# Patient Record
Sex: FEMALE | Race: WHITE | Employment: FULL TIME | URBAN - METROPOLITAN AREA
[De-identification: names, ages, dates, MRNs, and addresses within clinical notes are randomized per-mention and may not be internally consistent; named-entity substitution may affect disease eponyms.]

---

## 2018-01-10 ENCOUNTER — HOSPITAL ENCOUNTER (OUTPATIENT)
Facility: MEDICAL CENTER | Age: 63
End: 2018-01-10
Attending: DERMATOLOGY
Payer: COMMERCIAL

## 2018-01-10 ENCOUNTER — OFFICE VISIT (OUTPATIENT)
Dept: DERMATOLOGY | Facility: IMAGING CENTER | Age: 63
End: 2018-01-10
Payer: COMMERCIAL

## 2018-01-10 VITALS — TEMPERATURE: 96.8 F | WEIGHT: 208 LBS | BODY MASS INDEX: 38.28 KG/M2 | HEIGHT: 62 IN

## 2018-01-10 DIAGNOSIS — L57.0 ACTINIC KERATOSES: ICD-10-CM

## 2018-01-10 DIAGNOSIS — Z85.828 HISTORY OF BASAL CELL CARCINOMA: ICD-10-CM

## 2018-01-10 DIAGNOSIS — D48.5 NEOPLASM OF UNCERTAIN BEHAVIOR OF SKIN: ICD-10-CM

## 2018-01-10 PROCEDURE — 88305 TISSUE EXAM BY PATHOLOGIST: CPT

## 2018-01-10 PROCEDURE — 17003 DESTRUCT PREMALG LES 2-14: CPT | Performed by: DERMATOLOGY

## 2018-01-10 PROCEDURE — 17000 DESTRUCT PREMALG LESION: CPT | Performed by: DERMATOLOGY

## 2018-01-10 PROCEDURE — 11100 PR BIOPSY OF SKIN LESION: CPT | Mod: 59 | Performed by: DERMATOLOGY

## 2018-01-10 ASSESSMENT — ENCOUNTER SYMPTOMS
FEVER: 0
CHILLS: 0
WEIGHT LOSS: 0

## 2018-01-10 NOTE — PROGRESS NOTES
Dermatology New Patient Visit    Chief Complaint   Patient presents with   • Establish Care       Subjective:     HPI:   Page Zayas is a 62 y.o. female presenting for    Full skin exam today, h/o BCC  Area of concern skin - lesion right leg   Has been present for several months  Notes redness, mild itching  Does not believe it has grown in size  No treatments    Wears Suncreen regularly SPF 30   History of skin cancer: Yes, Details:  Basal on nose 7 years ago , last time she had a JOO  History of blistering/severe sunburns:Yes, Details:  Teenager   Family history of skin cancer:Yes, Details:  Sister , several basal cell carcinomas        Past Medical History:   Diagnosis Date   • Allergic rhinitis 6/6/2009   • Asthma, mild intermittent 6/6/2009   • Diabetes    • Diabetes mellitus, type 2 6/6/2009   • Dyslipidemia 6/6/2009   • High cholesterol    • Hypertension    • Hypovitaminosis D 6/6/2009   • IBS (irritable bowel syndrome) 6/6/2009   • Preventative health care 6/6/2009   • Restless leg syndrome    • S/P laparoscopic cholecystectomy 6/6/2009   • Sleep apnea 6/6/2009       Current Outpatient Prescriptions on File Prior to Visit   Medication Sig Dispense Refill   • hydrocod polst-chlorphen polst (TUSSIONEX) 10-8 MG/5ML LQCR Take 5 mL by mouth at bedtime as needed for Cough or Rhinitis. 120 mL 0   • neomycin-polymyxin-HC (CORTISPORIN) 3.5-82346-4 SUSP Place 5 Drops in ear 3 times a day. 1 Bottle 0   • hydrocodone-acetaminophen 2.5-167 mg/5 mL solution (LORTAB) 7.5-500 MG/15ML SOLN Take 10 mL by mouth 4 times a day as needed for Cough. 120 mL 0   • cefdinir (OMNICEF) 300 MG CAPS Take 1 Cap by mouth 2 times a day. 20 Each 0   • ondansetron (ZOFRAN) 4 MG TABS Take 1 Tab by mouth every 8 hours as needed for Nausea/Vomiting. 21 Each 1   • oxycodone-acetaminophen (PERCOCET) 5-325 MG TABS Take 1-2 Tabs by mouth every four hours as needed for Mild Pain (pain). 20 Each 0   • valsartan (DIOVAN) 320 MG tablet Take  1 Tab by mouth every day. 30 Each 11   • simvastatin (ZOCOR) 40 MG TABS Take 1 Tab by mouth every evening. 30 Each 11   • ropinirole (REQUIP) 2 MG tablet Take 1 Tab by mouth every day. 30 Each 6   • insulin glargine (LANTUS) 100 UNIT/ML SOLN Inject 0.4 mL as instructed every day. 2 Vial 11   • insulin lispro (HUMALOG) 100 UNIT/ML SOLN Inject 0.15 mL as instructed 3 times a day before meals. 2 Vial 11   • GUAIFENESIN-CODEINE 100-10 MG/5ML PO SYRP Take 5 mL by mouth 3 times a day as needed. 50 mL 0   • PROVENTIL  (90 BASE) MCG/ACT INH AERS Inhale 2 Puffs by mouth every 6 hours as needed for Shortness of Breath.     • ZYRTEC 10 MG PO TABS Take 10 mg by mouth every day.     • PROZAC 40 MG PO CAPS Take 40 mg by mouth every day.     • SINGULAIR 10 MG PO TABS Take 10 mg by mouth every day.     • VITAMIN D 2000 UNIT PO TABS Take  by mouth.       No current facility-administered medications on file prior to visit.        Allergies   Allergen Reactions   • Lotensin [Lotensin]      cough   • Sulfa Drugs        No family history on file.    Social History     Social History   • Marital status: Single     Spouse name: N/A   • Number of children: N/A   • Years of education: N/A     Occupational History   • Not on file.     Social History Main Topics   • Smoking status: Never Smoker   • Smokeless tobacco: Not on file   • Alcohol use No   • Drug use: No   • Sexual activity: Not on file     Other Topics Concern   • Not on file     Social History Narrative   • No narrative on file       Review of Systems   Constitutional: Negative for chills, fever and weight loss.   Skin: Negative for itching and rash.   All other systems reviewed and are negative.       Objective:     A full mucocutaneous exam was completed including: scalp, hair, ears, face, eyelids, conjunctiva, lips, gums/tongue/oropharynx, neck, chest breasts, abdomen, back, left and right upper extremities (including hands/digits and fingernails), left and right lower  "extremities (including feet/toes, toenails), buttocks, including external genitalia with the following pertinent findings listed below. Remaining above-listed examined areas within normal limits / negative for rashes or lesions.    Temperature 36 °C (96.8 °F), height 1.575 m (5' 2\"), weight 94.3 kg (208 lb).    Physical Exam   Constitutional: She is oriented to person, place, and time and well-developed, well-nourished, and in no distress.   HENT:   Head: Normocephalic and atraumatic.       Right Ear: External ear normal.   Left Ear: External ear normal.   Nose: Nose normal.   Mouth/Throat: Oropharynx is clear and moist.   Eyes: Conjunctivae and lids are normal.   Neck: Normal range of motion. Neck supple.   Cardiovascular: Intact distal pulses.    Pulmonary/Chest: Effort normal.   Neurological: She is alert and oriented to person, place, and time.   Skin: Skin is warm and dry.        Psychiatric: Mood and affect normal.   Vitals reviewed.      DATA: none applicable to review    Assessment and Plan:     1. Neoplasm of uncertain behavior of skin  Procedure Note   Procedure: Biopsy by shave technique  Location: as noted above  Size: as noted in exam  Preoperative diagnosis: SCIS vs BLK/inflamed SK vs eczema vs other  Risks, benefits and alternatives of procedure discussed and written informed consent obtained. Time out completed. Area of biopsy prepped with alcohol. Anesthesia with 1% lidocaine with epinephrine administered with 30 gauge needle. Shave biopsy of the site performed. Hemostasis achieved with pressure and aluminum chloride. Vaseline applied to wound with bandage. Patient tolerated procedure well and there were no complications. The specimen was sent to the pathology lab by the staff. Wound care was discussed.  - PATHOLOGY SPECIMEN; Future    2. Actinic keratoses - nose, left cheek  CRYOTHERAPY:  Discussed risks and benefits of cryotherapy. Patient verbally agreed. 2 applications of cryotherapy were " applied to 3 lesions on the face. Patient tolerated procedure well. Aftercare instructions given.    3. History of basal cell carcinoma  - Skin cancer education  - discussed importance of sun protective clothing, eyewear  - discussed importance of daily use of broad spectrum sunscreen with SPF 30 or greater, as well as need for reapplication ~every 2 hours when exposed to UVR  - discussed importance of regular self-exams, ideally once per month, annual exams in clinic  - ABCDE's of melanoma discussed  - patient to bring any new or concerning lesions to my attention      Followup: Return in about 1 year (around 1/10/2019) for JOO, additional f/u pending biopsy results.    Joan Calhoun M.D.

## 2018-01-15 ENCOUNTER — TELEPHONE (OUTPATIENT)
Dept: DERMATOLOGY | Facility: IMAGING CENTER | Age: 63
End: 2018-01-15

## 2018-04-25 ENCOUNTER — OFFICE VISIT (OUTPATIENT)
Dept: DERMATOLOGY | Facility: IMAGING CENTER | Age: 63
End: 2018-04-25
Payer: COMMERCIAL

## 2018-04-25 DIAGNOSIS — L57.0 ACTINIC KERATOSIS: ICD-10-CM

## 2018-04-25 DIAGNOSIS — Z85.828 HISTORY OF BASAL CELL CARCINOMA: ICD-10-CM

## 2018-04-25 DIAGNOSIS — D09.9 SQUAMOUS CELL CARCINOMA IN SITU: ICD-10-CM

## 2018-04-25 PROCEDURE — 17000 DESTRUCT PREMALG LESION: CPT | Performed by: DERMATOLOGY

## 2018-04-25 ASSESSMENT — ENCOUNTER SYMPTOMS
CHILLS: 0
FEVER: 0
WEIGHT LOSS: 0

## 2018-04-25 NOTE — PROGRESS NOTES
Dermatology New Patient Visit    No chief complaint on file.      Subjective:     HPI:   Page Zayas is a 62 y.o. female presenting for    Follow-up, spot check  S/p bx lesion on the right lower leg, results SCCIS, margins clear  States area well healed    Has red spot on the left anterior leg  Present for ~1 year  No change in size  No itching/bleeding/pain  No symptoms    Wears Suncreen regularly SPF 30   History of skin cancer: Yes, Details:  Basal on nose 7 years ago  History of blistering/severe sunburns:Yes, Details:  Teenager   Family history of skin cancer:Yes, Details:  Sister , several basal cell carcinomas      Past Medical History:   Diagnosis Date   • Allergic rhinitis 6/6/2009   • Asthma, mild intermittent 6/6/2009   • Diabetes    • Diabetes mellitus, type 2 ([facility]) 6/6/2009   • Dyslipidemia 6/6/2009   • High cholesterol    • Hypertension    • Hypovitaminosis D 6/6/2009   • IBS (irritable bowel syndrome) 6/6/2009   • Preventative health care 6/6/2009   • Restless leg syndrome    • S/P laparoscopic cholecystectomy 6/6/2009   • Sleep apnea 6/6/2009       Current Outpatient Prescriptions on File Prior to Visit   Medication Sig Dispense Refill   • hydrocod polst-chlorphen polst (TUSSIONEX) 10-8 MG/5ML LQCR Take 5 mL by mouth at bedtime as needed for Cough or Rhinitis. 120 mL 0   • neomycin-polymyxin-HC (CORTISPORIN) 3.5-67043-0 SUSP Place 5 Drops in ear 3 times a day. 1 Bottle 0   • hydrocodone-acetaminophen 2.5-167 mg/5 mL solution (LORTAB) 7.5-500 MG/15ML SOLN Take 10 mL by mouth 4 times a day as needed for Cough. 120 mL 0   • cefdinir (OMNICEF) 300 MG CAPS Take 1 Cap by mouth 2 times a day. 20 Each 0   • ondansetron (ZOFRAN) 4 MG TABS Take 1 Tab by mouth every 8 hours as needed for Nausea/Vomiting. 21 Each 1   • oxycodone-acetaminophen (PERCOCET) 5-325 MG TABS Take 1-2 Tabs by mouth every four hours as needed for Mild Pain (pain). 20 Each 0   • valsartan (DIOVAN) 320 MG tablet Take 1 Tab  by mouth every day. 30 Each 11   • simvastatin (ZOCOR) 40 MG TABS Take 1 Tab by mouth every evening. 30 Each 11   • ropinirole (REQUIP) 2 MG tablet Take 1 Tab by mouth every day. 30 Each 6   • insulin glargine (LANTUS) 100 UNIT/ML SOLN Inject 0.4 mL as instructed every day. 2 Vial 11   • insulin lispro (HUMALOG) 100 UNIT/ML SOLN Inject 0.15 mL as instructed 3 times a day before meals. 2 Vial 11   • GUAIFENESIN-CODEINE 100-10 MG/5ML PO SYRP Take 5 mL by mouth 3 times a day as needed. 50 mL 0   • PROVENTIL  (90 BASE) MCG/ACT INH AERS Inhale 2 Puffs by mouth every 6 hours as needed for Shortness of Breath.     • ZYRTEC 10 MG PO TABS Take 10 mg by mouth every day.     • PROZAC 40 MG PO CAPS Take 40 mg by mouth every day.     • SINGULAIR 10 MG PO TABS Take 10 mg by mouth every day.     • VITAMIN D 2000 UNIT PO TABS Take  by mouth.       No current facility-administered medications on file prior to visit.        Allergies   Allergen Reactions   • Lotensin [Lotensin]      cough   • Sulfa Drugs        No family history on file.    Social History     Social History   • Marital status: Single     Spouse name: N/A   • Number of children: N/A   • Years of education: N/A     Occupational History   • Not on file.     Social History Main Topics   • Smoking status: Never Smoker   • Smokeless tobacco: Not on file   • Alcohol use No   • Drug use: No   • Sexual activity: Not on file     Other Topics Concern   • Not on file     Social History Narrative   • No narrative on file       Review of Systems   Constitutional: Negative for chills, fever and weight loss.   Skin: Negative for itching and rash.   All other systems reviewed and are negative.       Objective:     A focused cutaneous exam was completed including: face, eyelids, conjunctiva, lips, left and right lower extremities with the following pertinent findings listed below. Remaining above-listed examined areas within normal limits / negative for rashes or  lesions.    There were no vitals taken for this visit.    Physical Exam   Constitutional: She is oriented to person, place, and time and well-developed, well-nourished, and in no distress.   HENT:   Head: Normocephalic and atraumatic.       Nose: Nose normal.   Eyes: Conjunctivae and lids are normal.   Neck: Normal range of motion. Neck supple.   Pulmonary/Chest: Effort normal.   Neurological: She is alert and oriented to person, place, and time.   Skin: Skin is warm and dry.        Psychiatric: Mood and affect normal.       DATA: none applicable to review    Assessment and Plan:     1. Squamous cell carcinoma in situ  Comment: area well-healed, no evidence of residual lesion/recurrence  - will continue to monitor    2. Actinic keratosis  CRYOTHERAPY:  Risks (including, but not limited to: hypo or hyperpigmentation, redness, blister, blood blister, recurrence, need for further treatment, infection, scar) and benefits of cryotherapy discussed. Patient verbally agreed to proceed with treatment. 2 cryotherapy freeze thaw cycles of 10 seconds were applied to 1 lesion on the left anterior leg with cryac. Patient tolerated procedure well. Aftercare instructions given.    3. History of basal cell carcinoma (and SCCIS above)  - Skin cancer education  - discussed importance of sun protective clothing, eyewear  - discussed importance of daily use of broad spectrum sunscreen with SPF 30 or greater, as well as need for reapplication ~every 2 hours when exposed to UVR  - discussed importance of regular self-exams, ideally once per month, q 4-6 month exams in clinic  - ABCDE's of melanoma discussed  - patient to bring any new or concerning lesions to my attention    Followup: Return for JOO, 3-4 months.    Joan Calhoun M.D.

## 2020-01-28 ENCOUNTER — OFFICE VISIT (OUTPATIENT)
Dept: URGENT CARE | Facility: PHYSICIAN GROUP | Age: 65
End: 2020-01-28
Payer: COMMERCIAL

## 2020-01-28 VITALS
RESPIRATION RATE: 18 BRPM | OXYGEN SATURATION: 96 % | SYSTOLIC BLOOD PRESSURE: 122 MMHG | WEIGHT: 210 LBS | DIASTOLIC BLOOD PRESSURE: 78 MMHG | TEMPERATURE: 97.9 F | BODY MASS INDEX: 38.64 KG/M2 | HEIGHT: 62 IN | HEART RATE: 76 BPM

## 2020-01-28 DIAGNOSIS — J01.00 ACUTE NON-RECURRENT MAXILLARY SINUSITIS: ICD-10-CM

## 2020-01-28 DIAGNOSIS — R05.9 COUGH: ICD-10-CM

## 2020-01-28 PROCEDURE — 99214 OFFICE O/P EST MOD 30 MIN: CPT | Performed by: PHYSICIAN ASSISTANT

## 2020-01-28 RX ORDER — BENZONATATE 200 MG/1
200 CAPSULE ORAL 3 TIMES DAILY PRN
Qty: 60 CAP | Refills: 0 | Status: SHIPPED | OUTPATIENT
Start: 2020-01-28

## 2020-01-28 RX ORDER — AMOXICILLIN AND CLAVULANATE POTASSIUM 875; 125 MG/1; MG/1
1 TABLET, FILM COATED ORAL 2 TIMES DAILY
Qty: 14 TAB | Refills: 0 | Status: SHIPPED | OUTPATIENT
Start: 2020-01-28 | End: 2020-02-04

## 2020-01-28 RX ORDER — INSULIN DEGLUDEC 200 U/ML
25 INJECTION, SOLUTION SUBCUTANEOUS EVERY EVENING
COMMUNITY
Start: 2019-11-24

## 2020-01-28 ASSESSMENT — ENCOUNTER SYMPTOMS
SINUS PRESSURE: 1
HEADACHES: 1
SHORTNESS OF BREATH: 1
HOARSE VOICE: 1
COUGH: 1
SORE THROAT: 0

## 2020-01-29 NOTE — PROGRESS NOTES
Subjective:   Page Zayas is a 64 y.o. female who presents for Sinusitis (chest congestion, cough, shortness of breath, chest pain, L ear plugged x1week)        PMH of Asthma- not using inhalers    Sinusitis   This is a new problem. The current episode started 1 to 4 weeks ago. The problem has been gradually worsening since onset. There has been no fever. The pain is moderate. Associated symptoms include congestion, coughing, ear pain (left), headaches, a hoarse voice, shortness of breath (occasional) and sinus pressure. Pertinent negatives include no sneezing or sore throat. (Upper chest tightness with post nasal drip) Past treatments include acetaminophen and oral decongestants (flonase, nasal saline, mucinex). The treatment provided mild relief.     Review of Systems   HENT: Positive for congestion, ear pain (left), hoarse voice and sinus pressure. Negative for sneezing and sore throat.    Respiratory: Positive for cough and shortness of breath (occasional).    Neurological: Positive for headaches.       PMH:  has a past medical history of Allergic rhinitis (6/6/2009), Asthma, mild intermittent (6/6/2009), Diabetes, Diabetes mellitus, type 2 (HCC) (6/6/2009), Dyslipidemia (6/6/2009), High cholesterol, Hypertension, Hypovitaminosis D (6/6/2009), IBS (irritable bowel syndrome) (6/6/2009), Preventative health care (6/6/2009), Restless leg syndrome, S/P laparoscopic cholecystectomy (6/6/2009), and Sleep apnea (6/6/2009).  MEDS:   Current Outpatient Medications:   •  amoxicillin-clavulanate (AUGMENTIN) 875-125 MG Tab, Take 1 Tab by mouth 2 times a day for 7 days., Disp: 14 Tab, Rfl: 0  •  benzonatate (TESSALON) 200 MG capsule, Take 1 Cap by mouth 3 times a day as needed for Cough., Disp: 60 Cap, Rfl: 0  •  valsartan (DIOVAN) 320 MG tablet, Take 1 Tab by mouth every day., Disp: 30 Each, Rfl: 11  •  simvastatin (ZOCOR) 40 MG TABS, Take 1 Tab by mouth every evening., Disp: 30 Each, Rfl: 11  •  insulin lispro  (HUMALOG) 100 UNIT/ML SOLN, Inject 0.15 mL as instructed 3 times a day before meals., Disp: 2 Vial, Rfl: 11  •  PROVENTIL  (90 BASE) MCG/ACT INH AERS, Inhale 2 Puffs by mouth every 6 hours as needed for Shortness of Breath., Disp: , Rfl:   •  PROZAC 40 MG PO CAPS, Take 40 mg by mouth every day., Disp: , Rfl:   •  TRESIBA FLEXTOUCH 200 UNIT/ML Solution Pen-injector, Inject 25 Units as instructed every evening., Disp: , Rfl:   •  hydrocod polst-chlorphen polst (TUSSIONEX) 10-8 MG/5ML LQCR, Take 5 mL by mouth at bedtime as needed for Cough or Rhinitis. (Patient not taking: Reported on 1/28/2020), Disp: 120 mL, Rfl: 0  •  neomycin-polymyxin-HC (CORTISPORIN) 3.5-49851-4 SUSP, Place 5 Drops in ear 3 times a day. (Patient not taking: Reported on 1/28/2020), Disp: 1 Bottle, Rfl: 0  •  hydrocodone-acetaminophen 2.5-167 mg/5 mL solution (LORTAB) 7.5-500 MG/15ML SOLN, Take 10 mL by mouth 4 times a day as needed for Cough. (Patient not taking: Reported on 1/28/2020), Disp: 120 mL, Rfl: 0  •  ondansetron (ZOFRAN) 4 MG TABS, Take 1 Tab by mouth every 8 hours as needed for Nausea/Vomiting. (Patient not taking: Reported on 1/28/2020), Disp: 21 Each, Rfl: 1  •  oxycodone-acetaminophen (PERCOCET) 5-325 MG TABS, Take 1-2 Tabs by mouth every four hours as needed for Mild Pain (pain). (Patient not taking: Reported on 1/28/2020), Disp: 20 Each, Rfl: 0  •  ropinirole (REQUIP) 2 MG tablet, Take 1 Tab by mouth every day. (Patient not taking: Reported on 1/28/2020), Disp: 30 Each, Rfl: 6  •  insulin glargine (LANTUS) 100 UNIT/ML SOLN, Inject 0.4 mL as instructed every day. (Patient not taking: Reported on 1/28/2020), Disp: 2 Vial, Rfl: 11  •  GUAIFENESIN-CODEINE 100-10 MG/5ML PO SYRP, Take 5 mL by mouth 3 times a day as needed. (Patient not taking: Reported on 1/28/2020), Disp: 50 mL, Rfl: 0  •  ZYRTEC 10 MG PO TABS, Take 10 mg by mouth every day., Disp: , Rfl:   •  SINGULAIR 10 MG PO TABS, Take 10 mg by mouth every day., Disp: , Rfl:  "  •  VITAMIN D 2000 UNIT PO TABS, Take  by mouth., Disp: , Rfl:   ALLERGIES:   Allergies   Allergen Reactions   • Lotensin [Lotensin]      cough   • Sulfa Drugs      SURGHX: History reviewed. No pertinent surgical history.  SOCHX:  reports that she has never smoked. She has never used smokeless tobacco. She reports that she does not drink alcohol or use drugs.  FH: Family history was reviewed, no pertinent findings to report   Objective:   /78 (BP Location: Right arm, Patient Position: Sitting, BP Cuff Size: Large adult)   Pulse 76   Temp 36.6 °C (97.9 °F) (Temporal)   Resp 18   Ht 1.575 m (5' 2\")   Wt 95.3 kg (210 lb)   SpO2 96%   BMI 38.41 kg/m²   Physical Exam  Vitals signs reviewed.   Constitutional:       General: She is not in acute distress.     Appearance: Normal appearance. She is well-developed. She is not toxic-appearing.   HENT:      Head: Normocephalic and atraumatic.      Right Ear: Tympanic membrane, ear canal and external ear normal.      Left Ear: Tympanic membrane, ear canal and external ear normal.      Nose: Mucosal edema and congestion present. No rhinorrhea.      Right Sinus: Maxillary sinus tenderness present. No frontal sinus tenderness.      Left Sinus: Maxillary sinus tenderness present. No frontal sinus tenderness.      Mouth/Throat:      Lips: Pink.      Mouth: Mucous membranes are moist.      Pharynx: Oropharynx is clear.   Eyes:      General: Lids are normal.      Conjunctiva/sclera: Conjunctivae normal.   Neck:      Musculoskeletal: Neck supple.   Cardiovascular:      Rate and Rhythm: Normal rate and regular rhythm.      Heart sounds: Normal heart sounds, S1 normal and S2 normal. No murmur. No friction rub. No gallop.    Pulmonary:      Effort: Pulmonary effort is normal. No respiratory distress.      Breath sounds: Normal breath sounds. No decreased breath sounds, wheezing, rhonchi or rales.   Musculoskeletal:      Comments: Normal range of motion.    Skin:     General: " Skin is warm and dry.      Capillary Refill: Capillary refill takes less than 2 seconds.   Neurological:      Mental Status: She is alert and oriented to person, place, and time.      Cranial Nerves: No cranial nerve deficit.      Sensory: No sensory deficit.   Psychiatric:         Speech: Speech normal.         Behavior: Behavior normal.         Thought Content: Thought content normal.         Judgment: Judgment normal.           Assessment/Plan:   1. Acute non-recurrent maxillary sinusitis  - amoxicillin-clavulanate (AUGMENTIN) 875-125 MG Tab; Take 1 Tab by mouth 2 times a day for 7 days.  Dispense: 14 Tab; Refill: 0    2. Cough  - benzonatate (TESSALON) 200 MG capsule; Take 1 Cap by mouth 3 times a day as needed for Cough.  Dispense: 60 Cap; Refill: 0    Other orders  - TRESIBA FLEXTOUCH 200 UNIT/ML Solution Pen-injector; Inject 25 Units as instructed every evening.    Patient to restart Dulera as directed.  Use albuterol as needed for shortness of breath and coughing episodes.      Pt instructed to complete full course of medication despite symptomatic improvement. Pt to take med with meals to alleviate GI upset. Pt to take a probiotic or eat Ketty’s yogurt/ kefir while taking the medication.    Flonase 1 squirt in each nostril, as instructed in clinic, once a day.  Use nasal saline TID to promote drainage.   Salt water gurgles to soothe sore throat.  Ayr saline gel to moisturize nasal passage and prevent bleeding.  Try to use sudafed sparingly in order to allow sinuses to drain.  Avoid the longer formulations and try to take only in the morning and/or at noon if needed.  If you fail to improve in 3-5 days or symptoms worsen/new symptoms develop, RTC for reevaluation    Differential diagnosis, natural history, supportive care, and indications for immediate follow-up discussed.

## 2021-03-15 DIAGNOSIS — Z23 NEED FOR VACCINATION: ICD-10-CM

## 2022-06-22 ENCOUNTER — APPOINTMENT (OUTPATIENT)
Dept: RADIOLOGY | Facility: MEDICAL CENTER | Age: 67
End: 2022-06-22
Attending: FAMILY MEDICINE
Payer: COMMERCIAL

## 2022-07-27 ENCOUNTER — APPOINTMENT (OUTPATIENT)
Dept: RADIOLOGY | Facility: MEDICAL CENTER | Age: 67
End: 2022-07-27
Attending: FAMILY MEDICINE
Payer: COMMERCIAL